# Patient Record
Sex: FEMALE | Race: BLACK OR AFRICAN AMERICAN
[De-identification: names, ages, dates, MRNs, and addresses within clinical notes are randomized per-mention and may not be internally consistent; named-entity substitution may affect disease eponyms.]

---

## 2018-03-23 ENCOUNTER — HOSPITAL ENCOUNTER (EMERGENCY)
Dept: HOSPITAL 17 - NEPE | Age: 18
LOS: 1 days | Discharge: HOME | End: 2018-03-24
Payer: COMMERCIAL

## 2018-03-23 VITALS
HEART RATE: 97 BPM | DIASTOLIC BLOOD PRESSURE: 94 MMHG | TEMPERATURE: 98 F | OXYGEN SATURATION: 100 % | RESPIRATION RATE: 16 BRPM | SYSTOLIC BLOOD PRESSURE: 153 MMHG
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
## 2018-03-23 DIAGNOSIS — S29.012A: ICD-10-CM

## 2018-03-23 DIAGNOSIS — Z3A.26: ICD-10-CM

## 2018-03-23 DIAGNOSIS — O9A.212: Primary | ICD-10-CM

## 2018-03-23 PROCEDURE — 99283 EMERGENCY DEPT VISIT LOW MDM: CPT

## 2018-03-24 VITALS
DIASTOLIC BLOOD PRESSURE: 83 MMHG | HEART RATE: 93 BPM | RESPIRATION RATE: 16 BRPM | OXYGEN SATURATION: 98 % | SYSTOLIC BLOOD PRESSURE: 132 MMHG

## 2018-03-24 NOTE — PD
HPI


Chief Complaint:  Pain: Acute or Chronic


Time Seen by Provider:  00:12


Travel History


International Travel<30 days:  No


Contact w/Intl Traveler<30days:  No


Traveled to known affect area:  No





History of Present Illness


HPI


Patient reports being 26 week pregnant today she had upper back pain and 

substernal chest pain.  She did not feel short of breath she has not had any 

vaginal bleeding no abdominal cramping no complications with the baby no 

complications with the baby she has not had any vaginal bleeding no abdominal 

cramping  She did not feel short of breath today she had upper back pain and 

substernal chest pain.Patient reports being 26 week pregnant.  She had a 

ultrasound 3 week  ago that was normal.





[facility]


Past Medical History


Medical History:  Denies Significant Hx


Immunizations Current:  Yes


Pregnant?:  Pregnant





Past Surgical History


Surgical History:  No Previous Surgery





Social History


Alcohol Use:  No


Tobacco Use:  No


Substance Use:  No





Allergies-Medications


(Allergen,Severity, Reaction):  


Coded Allergies:  


     No Known Allergies (Unverified , 3/24/18)


Reported Meds & Prescriptions





Reported Meds & Active Scripts


Active


Tylenol (Acetaminophen) 325 Mg Tab 650 Mg PO Q6H PRN








Review of Systems


Except as stated in HPI:  all other systems reviewed are Neg


Musculoskeletal:  Positive: Pain (upper right back pain  reproducible )





Physical Exam


Narrative


GENERAL: gravid  in no acute distress  surrounded by friends family


SKIN: Warm and dry.


HEAD: Atraumatic. Normocephalic. 


EYES: Pupils equal and round. No scleral icterus. No injection or drainage. 


ENT: No nasal bleeding or discharge.  Mucous membranes pink and moist.


NECK: Trachea midline. No JVD. 


CARDIOVASCULAR: Regular rate and rhythm.  


RESPIRATORY: No accessory muscle use. Clear to auscultation. Breath sounds 

equal bilaterally. 


GASTROINTESTINAL: Abdomen  gravid  correlates with dates soft, non-tender, 

nondistended. Hepatic and splenic margins not palpable. 


MUSCULOSKELETAL: Extremities without clubbing, cyanosis, or edema. No obvious 

deformities.  Mid-thoracic reproducible pain 


NEUROLOGICAL: Awake and alert. No obvious cranial nerve deficits.  Motor 

grossly within normal limits. Five out of 5 muscle strength in the arms and 

legs.  Normal speech.


PSYCHIATRIC: Appropriate mood and affect; insight and judgment normal.


POC  bedside  sono  good fetal  activity and  HR  m-mode  158





Data


Data


Last Documented VS





Vital Signs








  Date Time  Temp Pulse Resp B/P (MAP) Pulse Ox O2 Delivery O2 Flow Rate FiO2


 


3/24/18 01:39        


 


3/24/18 01:18  93 16  98 Room Air  


 


3/23/18 23:59 98.0       








Orders





 Orders


Acetaminophen (Tylenol) (3/24/18 01:30)


Ed Discharge Order (3/24/18 01:26)








MDM


Medical Decision Making


Medical Screen Exam Complete:  Yes


Emergency Medical Condition:  Yes


Differential Diagnosis


gravid abdo with back strain  vs  PE  vs  costochondritis


Narrative Course


bedside  US   fetal HR  158  good activity  , tylenol for muscle strain,,  pt 

is not tachycardic  no SOB , no sign  sx of  PE  d/c  OB follow up





Procedures


**Procedure Narrative**


POC  bedside  US   fetal HR  158 on MODE and  good  fetal activity  mother 

reassured





Diagnosis





 Primary Impression:  


 Muscle strain of right upper back


 Qualified Codes:  S29.012A - Strain of muscle and tendon of back wall of thorax

, initial encounter


Patient Instructions:  General Instructions, Muscle Strain (ED), Pregnancy at 

19 to 22 Weeks (ED)


Scripts


Acetaminophen (Tylenol) 325 Mg Tab


650 MG PO Q6H Y for PAIN SCALE 3 TO 5, #20 TAB 0 Refills


   Prov: Mark Sosa MD         3/24/18


Disposition:  01 DISCHARGE HOME


Condition:  Good











Mark Sosa MD Mar 24, 2018 01:31